# Patient Record
Sex: FEMALE
[De-identification: names, ages, dates, MRNs, and addresses within clinical notes are randomized per-mention and may not be internally consistent; named-entity substitution may affect disease eponyms.]

---

## 2020-10-30 ENCOUNTER — HOSPITAL ENCOUNTER (EMERGENCY)
Dept: HOSPITAL 56 - MW.ED | Age: 25
Discharge: HOME | End: 2020-10-30
Payer: COMMERCIAL

## 2020-10-30 DIAGNOSIS — N93.8: Primary | ICD-10-CM

## 2020-10-30 DIAGNOSIS — Z79.899: ICD-10-CM

## 2020-10-30 LAB
BUN SERPL-MCNC: 10 MG/DL (ref 7–18)
CHLORIDE SERPL-SCNC: 104 MMOL/L (ref 98–107)
CO2 SERPL-SCNC: 22.7 MMOL/L (ref 21–32)
GLUCOSE SERPL-MCNC: 81 MG/DL (ref 74–106)
POTASSIUM SERPL-SCNC: 3.6 MMOL/L (ref 3.5–5.1)
SODIUM SERPL-SCNC: 139 MMOL/L (ref 136–145)

## 2020-10-30 PROCEDURE — 99284 EMERGENCY DEPT VISIT MOD MDM: CPT

## 2020-10-30 PROCEDURE — 76830 TRANSVAGINAL US NON-OB: CPT

## 2020-10-30 PROCEDURE — 81025 URINE PREGNANCY TEST: CPT

## 2020-10-30 PROCEDURE — 36415 COLL VENOUS BLD VENIPUNCTURE: CPT

## 2020-10-30 PROCEDURE — 81001 URINALYSIS AUTO W/SCOPE: CPT

## 2020-10-30 PROCEDURE — 85025 COMPLETE CBC W/AUTO DIFF WBC: CPT

## 2020-10-30 PROCEDURE — 80053 COMPREHEN METABOLIC PANEL: CPT

## 2020-10-30 NOTE — US
HISTORY:



Vaginal bleeding; 3 months postpartum.



TECHNIQUE:



Transabdominal and transvaginal pelvic ultrasound.



COMPARISON:



No prior.



FINDINGS:



The uterus measures approximately 4.4 x 5.1 x 8.5 cm in size. There is an 

IUD within the uterus which appears appropriately positioned. Endometrial 

stripe thickness is thin measuring 4 mm. No uterine mass appreciated.



-



Right ovary measures 1.6 x 3.1 x 3 cm in size. Small follicles present 

within the right ovary. Blood flow detected within the right ovary without 

findings of torsion.



-



Left ovary measures 1.9 x 2.6 x 2.8 cm in size. Small follicles are present 

within the left ovary. Blood flow is detected within left ovary without 

findings of torsion.



-



No significant pelvic free fluid.



IMPRESSION:



1. IUD appears appropriately positioned.



2. Thin endometrial stripe of 4 mm.



3. Normal ovaries.



Dictated by Ki Martin MD @ 10/30/2020 2:32:27 PM



Dictated by: Ki Martin MD @ 10/30/2020 14:32:32



(Electronically Signed)

## 2020-10-30 NOTE — EDM.PDOC
ED HPI GENERAL MEDICAL PROBLEM





- General


Chief Complaint: OB/GYN Problem


Stated Complaint: HEMORRHAGING/DIZZINESS


Time Seen by Provider: 10/30/20 11:55


Source of Information: Reports: Patient


History Limitations: Reports: No Limitations





- History of Present Illness


INITIAL COMMENTS - FREE TEXT/NARRATIVE: 


HISTORY AND PHYSICAL:





History of present illness:


Patient is a 24-year-old female who presents to the emergency room with 

complaints of vaginal bleeding.  She is 3 months postpartum, uncomplicated 

pregnancy/delivery.  States she had an IUD placed 6 weeks postpartum without any

complications.  She has no concerns of pregnancy.  1 week ago she completed a 

"normal period".  3 days ago she had started heavy vaginal bleeding stating she 

is going through multiple pads and tampons, which is unusual for her.  She 

complains of feeling dizzy and lightheaded.  No recent pelvic injury/trauma or 

rough physical activity.  Patient denies any fever, chills, headache, change in 

vision, chest pain, back pain, shortness of breath or cough. Denies any 

abdominal pain, nausea, vomiting, diarrhea, constipation or dysuria. Has not 

noted any blood in urine or stool. Patient has been eating and drinking 

appropriately.





Review of systems: 


As per history of present illness and below otherwise all systems reviewed and 

negative.





Past medical history: 


As per history of present illness and as reviewed below otherwise 

noncontributory.





Surgical history: 


As per history of present illness and as reviewed below otherwise 

noncontributory.





Social history: 


See social history for further information





Family history: 


As per history of present illness and as reviewed below otherwise 

noncontributory.





Physical exam:


General: Well developed and well nourished. Alert and orientated x 3. Nontoxic 

in appearance and in no acute distress. Vital signs are stable and have been 

reviewed by me. Nursing notes were reviewed. 


HEENT: Atraumatic, normocephalic, pupils equal and reactive bilaterally, 

negative for conjunctival pallor or scleral icterus, mucous membranes moist, TMs

normal bilaterally, throat clear, neck supple, nontender, trachea midline. No 

drooling or trismus noted. No meningeal signs. No hot potato voice noted. 


Lungs: Clear to auscultation, breath sounds equal bilaterally, chest nontender. 

Normal work of breathing, no accessory muscles used.


Heart: S1S2, regular rate and rhythm without overt murmur


Abdomen: Soft, nondistended, nontender. Negative for masses or 

hepatosplenomegaly. Negative for costovertebral tenderness.


Pelvis: Stable nontender.


Genitourinary: This was done with consent and a chaperone at the bedside.  

Normal-appearing external genitalia.  Speculum exam shows fresh and dark brown 

blood in the vaginal vault, small amount of trickling coming from the os.  No 

clots or foreign body noted.  No cervical motion tenderness.


Skin: Intact, warm, dry. No lesions or rashes noted.


Hematologic: No petechiae or purpra. Mucosa appropriate color and normal nail 

bed color and refill.


Extremities: Atraumatic, moves all extremities per self without difficulty or 

deficits, negative for cords or calf pain. Neurovascular unremarkable.


Neuro: Awake, alert, oriented. Cranial nerves II through XII unremarkable. 

Cerebellum unremarkable. Motor and sensory unremarkable throughout. Exam 

nonfocal.


Psychiatric: Mood and affect are appropriate.  Normal thought process. Answering

questions appropriately.





Notes:


Lab work is unremarkable.  My pelvic exam shows no evidence of profuse/moderate 

vaginal bleeding.  Her vital signs remained stable and the Ortho static vital 

signs are within normal limits.  Ultrasound shows an IUD in the appropriate 

position.  Normal ovaries. I have spoken with the patient/caregiver and 

discussed today's findings, in addition to providing specific details for plan 

of care.  Reassessment at the time of disposition demonstrates that the patient 

is in no acute distress.  The patient has remained stable throughout the entire 

ED visit and is without objective evidence for acute process requiring urgent 

intervention or hospitalization.  The patient is stable for discharge, 

counseling was provided and we discussed in great detail signs and symptoms that

would prompt them to return to the Emergency Department. Medication, follow up 

and supportive care measures were reviewed and discussed. Voices understanding 

and is agreeable to plan of care. Denies any further questions or concerns at 

this time.





Diagnostics:


CBC, CMP, UA, urine pregnancy, transvaginal ultrasound





Therapeutics:


IV fluids





Prescription:


None





Impression: 


Dysfunctional Uterine Bleeding





Plan:


1. Today your lab work and pelvic ultrasound are normal. 


2.  Can alternate Tylenol and ibuprofen as needed for pain management.  Make 

sure you are drinking plenty of fluids to prevent dehydration.


3. We encourage you to follow up with your OBGYN in the next few days for re-

evaluation and further care/management. If your symptoms should worsen, new 

symptoms develop or any of the signs and symptoms we discussed should arise 

please return to the emergency room or call 911 (if needed).





Definitive disposition and diagnosis as appropriate pending reevaluation and 

review of above.





  ** back


Pain Score (Numeric/FACES): 8





- Related Data


                                    Allergies











Allergy/AdvReac Type Severity Reaction Status Date / Time


 


No Known Allergies Allergy   Verified 10/30/20 12:37











Home Meds: 


                                    Home Meds





Sertraline [Zoloft] 100 mg PO DAILY 10/30/20 [History]











ED ROS GENERAL





- Review of Systems


Review Of Systems: Comprehensive ROS is negative, except as noted in HPI.





ED EXAM, GI/ABD





- Physical Exam


Exam: See Below (See dictation)





Course





- Vital Signs


Last Recorded V/S: 


                                Last Vital Signs











Temp  97.9 F   10/30/20 11:56


 


Pulse  72   10/30/20 11:56


 


Resp  17   10/30/20 11:56


 


BP  117/76   10/30/20 11:56


 


Pulse Ox  99   10/30/20 11:56








                                        





Orthostatic Blood Pressure [     110/78


Standing]                        


Orthostatic Blood Pressure [     116/76


Sitting]                         


Orthostatic Blood Pressure [     111/72


Supine]                          











- Orders/Labs/Meds


Orders: 


                               Active Orders 24 hr











 Category Date Time Status


 


 Orthostatic Vital Signs [RC] ASDIRECTED Care  10/30/20 11:56 Active











Labs: 


                                Laboratory Tests











  10/30/20 10/30/20 10/30/20 Range/Units





  12:00 12:40 13:33 


 


WBC   8.15   (4.0-11.0)  K/uL


 


RBC   4.68   (4.30-5.90)  M/uL


 


Hgb   13.7   (12.0-16.0)  g/dL


 


Hct   41.6   (36.0-46.0)  %


 


MCV   88.9   (80.0-98.0)  fL


 


MCH   29.3   (27.0-32.0)  pg


 


MCHC   32.9   (31.0-37.0)  g/dL


 


RDW Std Deviation   52.4   (28.0-62.0)  fl


 


RDW Coeff of Grisel   16 H   (11.0-15.0)  %


 


Plt Count   321   (150-400)  K/uL


 


MPV   10.30   (7.40-12.00)  fL


 


Neut % (Auto)   58.1   (48.0-80.0)  %


 


Lymph % (Auto)   33.7   (16.0-40.0)  %


 


Mono % (Auto)   5.3   (0.0-15.0)  %


 


Eos % (Auto)   2.7   (0.0-7.0)  %


 


Baso % (Auto)   0.2   (0.0-1.5)  %


 


Neut # (Auto)   4.7   (1.4-5.7)  K/uL


 


Lymph # (Auto)   2.8 H   (0.6-2.4)  K/uL


 


Mono # (Auto)   0.4   (0.0-0.8)  K/uL


 


Eos # (Auto)   0.2   (0.0-0.7)  K/uL


 


Baso # (Auto)   0.0   (0.0-0.1)  K/uL


 


Nucleated RBC %   0.0   /100WBC


 


Nucleated RBCs #   0   K/uL


 


Sodium  139    (136-145)  mmol/L


 


Potassium  3.6    (3.5-5.1)  mmol/L


 


Chloride  104    ()  mmol/L


 


Carbon Dioxide  22.7    (21.0-32.0)  mmol/L


 


BUN  10    (7.0-18.0)  mg/dL


 


Creatinine  0.7    (0.6-1.0)  mg/dL


 


Est Cr Clr Drug Dosing  98.01    mL/min


 


Estimated GFR (MDRD)  > 60.0    ml/min


 


Glucose  81    ()  mg/dL


 


Calcium  9.2    (8.5-10.1)  mg/dL


 


Total Bilirubin  0.4    (0.2-1.0)  mg/dL


 


AST  18    (15-37)  IU/L


 


ALT  22    (14-63)  IU/L


 


Alkaline Phosphatase  123 H    ()  U/L


 


Total Protein  7.4    (6.4-8.2)  g/dL


 


Albumin  4.5    (3.4-5.0)  g/dL


 


Globulin  2.9    (2.6-4.0)  g/dL


 


Albumin/Globulin Ratio  1.6    (0.9-1.6)  


 


Urine Color    YELLOW  


 


Urine Appearance    CLEAR  


 


Urine pH    7.0  (5.0-8.0)  


 


Ur Specific Gravity    1.010  (1.001-1.035)  


 


Urine Protein    NEGATIVE  (NEGATIVE)  mg/dL


 


Urine Glucose (UA)    NEGATIVE  (NEGATIVE)  mg/dL


 


Urine Ketones    NEGATIVE  (NEGATIVE)  mg/dL


 


Urine Occult Blood    SMALL H  (NEGATIVE)  


 


Urine Nitrite    NEGATIVE  (NEGATIVE)  


 


Urine Bilirubin    NEGATIVE  (NEGATIVE)  


 


Urine Urobilinogen    0.2  (<2.0)  EU/dL


 


Ur Leukocyte Esterase    NEGATIVE  (NEGATIVE)  


 


Urine RBC    1-2  (0-2/HPF)  


 


Urine WBC    0-1  (0-5/HPF)  


 


Ur Epithelial Cells    FEW  (NONE-FEW)  


 


Urine Bacteria    RARE  (NEGATIVE)  


 


Urine HCG, Qual     (NEGATIVE)  














  10/30/20 Range/Units





  13:33 


 


WBC   (4.0-11.0)  K/uL


 


RBC   (4.30-5.90)  M/uL


 


Hgb   (12.0-16.0)  g/dL


 


Hct   (36.0-46.0)  %


 


MCV   (80.0-98.0)  fL


 


MCH   (27.0-32.0)  pg


 


MCHC   (31.0-37.0)  g/dL


 


RDW Std Deviation   (28.0-62.0)  fl


 


RDW Coeff of Grisel   (11.0-15.0)  %


 


Plt Count   (150-400)  K/uL


 


MPV   (7.40-12.00)  fL


 


Neut % (Auto)   (48.0-80.0)  %


 


Lymph % (Auto)   (16.0-40.0)  %


 


Mono % (Auto)   (0.0-15.0)  %


 


Eos % (Auto)   (0.0-7.0)  %


 


Baso % (Auto)   (0.0-1.5)  %


 


Neut # (Auto)   (1.4-5.7)  K/uL


 


Lymph # (Auto)   (0.6-2.4)  K/uL


 


Mono # (Auto)   (0.0-0.8)  K/uL


 


Eos # (Auto)   (0.0-0.7)  K/uL


 


Baso # (Auto)   (0.0-0.1)  K/uL


 


Nucleated RBC %   /100WBC


 


Nucleated RBCs #   K/uL


 


Sodium   (136-145)  mmol/L


 


Potassium   (3.5-5.1)  mmol/L


 


Chloride   ()  mmol/L


 


Carbon Dioxide   (21.0-32.0)  mmol/L


 


BUN   (7.0-18.0)  mg/dL


 


Creatinine   (0.6-1.0)  mg/dL


 


Est Cr Clr Drug Dosing   mL/min


 


Estimated GFR (MDRD)   ml/min


 


Glucose   ()  mg/dL


 


Calcium   (8.5-10.1)  mg/dL


 


Total Bilirubin   (0.2-1.0)  mg/dL


 


AST   (15-37)  IU/L


 


ALT   (14-63)  IU/L


 


Alkaline Phosphatase   ()  U/L


 


Total Protein   (6.4-8.2)  g/dL


 


Albumin   (3.4-5.0)  g/dL


 


Globulin   (2.6-4.0)  g/dL


 


Albumin/Globulin Ratio   (0.9-1.6)  


 


Urine Color   


 


Urine Appearance   


 


Urine pH   (5.0-8.0)  


 


Ur Specific Gravity   (1.001-1.035)  


 


Urine Protein   (NEGATIVE)  mg/dL


 


Urine Glucose (UA)   (NEGATIVE)  mg/dL


 


Urine Ketones   (NEGATIVE)  mg/dL


 


Urine Occult Blood   (NEGATIVE)  


 


Urine Nitrite   (NEGATIVE)  


 


Urine Bilirubin   (NEGATIVE)  


 


Urine Urobilinogen   (<2.0)  EU/dL


 


Ur Leukocyte Esterase   (NEGATIVE)  


 


Urine RBC   (0-2/HPF)  


 


Urine WBC   (0-5/HPF)  


 


Ur Epithelial Cells   (NONE-FEW)  


 


Urine Bacteria   (NEGATIVE)  


 


Urine HCG, Qual  NEGATIVE  (NEGATIVE)  











Meds: 


Medications














Discontinued Medications














Generic Name Dose Route Start Last Admin





  Trade Name Freq  PRN Reason Stop Dose Admin


 


Sodium Chloride  1,000 mls @ 999 mls/hr  10/30/20 11:56  10/30/20 12:38





  Normal Saline  IV  10/30/20 12:56  999 mls/hr





  STAT ONE   Administration














Departure





- Departure


Time of Disposition: 14:45


Disposition: Home, Self-Care 01


Clinical Impression: 


 Dysfunctional uterine bleeding








- Discharge Information


Instructions:  Dysfunctional Uterine Bleeding


Referrals: 


PCP,None [Primary Care Provider] - 


Forms:  ED Department Discharge


Additional Instructions: 


The following information is given to patients seen in the emergency department 

who are being discharged to home. This information is to outline your options 

for follow-up care. We provide all patients seen in our emergency department 

with a follow-up referral.





The need for follow-up, as well as the timing and circumstances, are variable 

depending upon the specifics of your emergency department visit.





If you don't have a primary care physician on staff, we will provide you with a 

referral. We always advise you to contact your personal physician following an 

emergency department visit to inform them of the circumstance of the visit and 

for follow-up with them and/or the need for any referrals to a consulting 

specialist.





The emergency department will also refer you to a specialist when appropriate. 

This referral assures that you have the opportunity for follow-up care with a 

specialist. All of these measure are taken in an effort to provide you with 

optimal care, which includes your follow-up.





Under all circumstances we always encourage you to contact your private 

physician who remains a resource for coordinating your care. When calling for 

follow-up care, please make the office aware that this follow-up is from your 

recent emergency room visit. If for any reason you are refused follow-up, please

contact the [facility] Emergency Department

at [phone] and asked to speak to the emergency department charge nurse.





[facility]


Primary Care


[address] [place] [id number]


Phone: [phone]


Fax: [phone]





[address] [address]


Phone: [phone]


Fax: [phone]





Thank you for choosing the [facility] emergency department in 

[place] for your medical needs today.  It was a pleasure caring for you. Today

you were seen in the emergency department for vaginal bleeding.





1. Today your lab work and pelvic ultrasound are normal. Your IUD is in place. 

No evidence of severe/significant blood loss.


2.  Can alternate Tylenol and ibuprofen as needed for pain management.  Make 

sure you are drinking plenty of fluids to prevent dehydration.


3. We encourage you to follow up with your OBGYN in the next few days for re-

evaluation and further care/management. If your symptoms should worsen, new 

symptoms develop or any of the signs and symptoms we discussed should arise 

please return to the emergency room or call 911 (if needed).





Sepsis Event Note (ED)





- Evaluation


Sepsis Screening Result: No Definite Risk





- Focused Exam


Vital Signs: 


                                   Vital Signs











  Temp Pulse Resp BP Pulse Ox


 


 10/30/20 11:56  97.9 F  72  17  117/76  99














- My Orders


Last 24 Hours: 


My Active Orders





10/30/20 11:56


Orthostatic Vital Signs [RC] ASDIRECTED 














- Assessment/Plan


Last 24 Hours: 


My Active Orders





10/30/20 11:56


Orthostatic Vital Signs [RC] ASDIRECTED

## 2020-12-03 ENCOUNTER — HOSPITAL ENCOUNTER (EMERGENCY)
Dept: HOSPITAL 56 - MW.ED | Age: 25
LOS: 1 days | Discharge: PSYCHIATRIC OR SUBSTANCE USE FACILITY | End: 2020-12-04
Payer: COMMERCIAL

## 2020-12-03 DIAGNOSIS — F32.9: ICD-10-CM

## 2020-12-03 DIAGNOSIS — Z79.899: ICD-10-CM

## 2020-12-03 DIAGNOSIS — T43.222A: Primary | ICD-10-CM

## 2020-12-03 DIAGNOSIS — Z20.828: ICD-10-CM

## 2020-12-03 LAB
APAP SERPL-MCNC: <2 UG/ML
BUN SERPL-MCNC: 9 MG/DL (ref 7–18)
CHLORIDE SERPL-SCNC: 105 MMOL/L (ref 98–107)
CO2 SERPL-SCNC: 26.5 MMOL/L (ref 21–32)
GLUCOSE SERPL-MCNC: 80 MG/DL (ref 74–106)
POTASSIUM SERPL-SCNC: 3.8 MMOL/L (ref 3.5–5.1)
SODIUM SERPL-SCNC: 143 MMOL/L (ref 136–145)

## 2020-12-03 PROCEDURE — U0002 COVID-19 LAB TEST NON-CDC: HCPCS

## 2020-12-03 NOTE — EDM.PDOC
ED HPI GENERAL MEDICAL PROBLEM





- General


Chief Complaint: Drug or Alcohol Abuse


Stated Complaint: OVERDOSE


Time Seen by Provider: 20 20:40





- History of Present Illness


INITIAL COMMENTS - FREE TEXT/NARRATIVE: 





HISTORY AND PHYSICAL:





History of present illness:


This is a 24-year-old female who presents ER today secondary to ingestion of 

1000 mg of her Zoloft approximately 6 PM tonight.  Patient reports that she does

have a history significant for depression and anxiety in the past and that she 

has had an episode in the past where she attempted overdose as a suicide 

attempt.  Patient reports that although she does not think she was try to harm 

her self she definitely feels like this was a cry for help.  Patient feels 

overwhelmed with the amount of work that she has to do at home with 3 children. 

She reports that her  works a lot and that she cares for her 3 young 

children on her own.  Patient reports that her youngest is 5-month-old.  She 

reports it is a spontaneous vaginal delivery but was complicated with nuchal 

cord.  She reports that her baby has stomach problems and cries a lot and does 

not sleep very much at home.  Patient denies any recent fevers, shakes, chills, 

nausea, vomiting, diarrhea, dysuria, frequency, urgency, chest pain, shortness 

of breath.  Patient has no coronavirus or Covid exposure concerns.  Patient 

denies any auditory or visual hallucinations.





Patient denies any history of hypertension, diabetes, liver, lung, kidney 

problems.  Patient reports that she did have elevated blood sugar during 

pregnancies.


Patient reports that she does smoke cigarettes but denies any alcohol or drugs.


Patient has no known drug allergies.


Patient denies any abdominal or chest surgeries in the past.


Patient's mental health history significant for depression and anxiety.





Review of systems: 


As per history of present illness and below otherwise all systems reviewed and 

negative.





Past medical history: 


As per history of present illness and as reviewed below otherwise 

noncontributory.





Surgical history: 


As per history of present illness and as reviewed below otherwise 

noncontributory.





Social history: 


No reported history of drug or alcohol abuse.





Family history: 


As per history of present illness and as reviewed below otherwise 

noncontributory.





Physical exam:


Constitutional: Patient is oriented to person, place, and time.  Appears well-

developed and well-nourished.  No distress.


 HEENT: Moist mucous membranes


 Head: Normocephalic and atraumatic


 Eyes: Right eye exhibits no discharge.  Left eye exhibits no discharge.  No 

scleral icterus


 Neck: Normal range of motion.  No tracheal deviation present.


 Cardiovascular: Normal rate and regular rhythm.


 Pulmonary: Effort normal, no respiratory distress.


Abd:  Soft, nondistended, no rebound/guarding, no psoas or obturator signs, no 

tenderness at Mcberney's point, no Fuller's sign.  Pt does not present with an 

exam that would be consistent with an acute surgical abdomen at this time


 Musculoskeletal: Normal range of motion


 Neurologic: Alert and oriented to person, place and time.


 Skin: Pink, warm and dry.  


 Psychiatric: Patient has a very flat affect during evaluation with episodes of 

crying during my evaluation.  Patient appears to be extremely depressed and 

tearful.  Patient is otherwise appropriate.


 Nursing note and vital signs have been reviewed





Diagnostics:


CBC, CMP, Tylenol, salicylate, alcohol level.  UDS, coronavirus test.  EKG











Assessment and plan:


This is a 24-year-old female who is  3 para 3 and approximately 6-month 

postpartum who presents ER today with an intentional overdose of 100 mg Zoloft 

tablets x10 pills.  Patient reports that she did not secondary to depression and

too much stress in her life.  Patient reports that she feels like she is alone. 

Patient reports that she is  but her  works all the time and that 

caring for 3 children on her own was too much stress.  Patient reports she does 

have a history of SI in the past and has overdosed once in the distant past.  

Patient was monitored here in the ER for approximately 8 hours postingestion and

has been clinically and hemodynamically stable throughout.  Case has been 

discussed with poison control.  Patient has been cooperative.  No medication has

been administered in the ED.





Patient be need to transferred for mental health.  I have discussed the case 

with Dr. Merchant at [facility] and he has agreed to assist us with 

inpatient level of care and monitoring of this patient's mental health.











Definitive disposition and diagnosis as appropriate pending reevaluation and 

review of above.











- Related Data


                                    Allergies











Allergy/AdvReac Type Severity Reaction Status Date / Time


 


No Known Allergies Allergy   Verified 20 20:30











Home Meds: 


                                    Home Meds





Sertraline [Zoloft] 100 mg PO DAILY 10/30/20 [History]











Past Medical History


OB/GYN History: Reports: Pregnancy


Psychiatric History: Reports: Depression


Hematologic History: Reports: Anemia





Social & Family History





- Family History


Family Medical History: No Pertinent Family History





- Tobacco Use


Tobacco Use Status *Q: Never Tobacco User





- Caffeine Use


Caffeine Use: Reports: None





- Recreational Drug Use


Recreational Drug Use: No





ED ROS GENERAL





- Review of Systems


Review Of Systems: See Below





ED EXAM, GENERAL





- Physical Exam


Exam: See Below


  ** #1 Interpretation


EKG Interpretation Comments: 





EKG:


As interpreted by ER physician: Tyler:


Nonspecific ST-T wave abnormalities


Normal axis


No evidence of ST elevation MI


Normal sinus rhythm heart rate of 85





Course





- Vital Signs


Last Recorded V/S: 


                                Last Vital Signs











Temp  99.6 F   20 20:26


 


Pulse  77   20 01:55


 


Resp  14   20 01:55


 


BP  113/72   20 01:55


 


Pulse Ox  97   20 01:55














- Orders/Labs/Meds


Orders: 


                               Active Orders 24 hr











 Category Date Time Status


 


 EKG Documentation Completion [RC] STAT Care  20 20:40 Active











Labs: 


                                Laboratory Tests











  20 Range/Units





  20:55 20:55 21:05 


 


WBC  10.16    (4.0-11.0)  K/uL


 


RBC  4.89    (4.30-5.90)  M/uL


 


Hgb  14.9    (12.0-16.0)  g/dL


 


Hct  45.0    (36.0-46.0)  %


 


MCV  92.0    (80.0-98.0)  fL


 


MCH  30.5    (27.0-32.0)  pg


 


MCHC  33.1    (31.0-37.0)  g/dL


 


RDW Std Deviation  46.0    (28.0-62.0)  fl


 


RDW Coeff of Grisel  14    (11.0-15.0)  %


 


Plt Count  330    (150-400)  K/uL


 


MPV  10.40    (7.40-12.00)  fL


 


Neut % (Auto)  52.8    (48.0-80.0)  %


 


Lymph % (Auto)  36.1    (16.0-40.0)  %


 


Mono % (Auto)  7.3    (0.0-15.0)  %


 


Eos % (Auto)  3.3    (0.0-7.0)  %


 


Baso % (Auto)  0.5    (0.0-1.5)  %


 


Neut # (Auto)  5.4    (1.4-5.7)  K/uL


 


Lymph # (Auto)  3.7 H    (0.6-2.4)  K/uL


 


Mono # (Auto)  0.7    (0.0-0.8)  K/uL


 


Eos # (Auto)  0.3    (0.0-0.7)  K/uL


 


Baso # (Auto)  0.1    (0.0-0.1)  K/uL


 


Nucleated RBC %  0.0    /100WBC


 


Nucleated RBCs #  0    K/uL


 


Sodium   143   (136-145)  mmol/L


 


Potassium   3.8   (3.5-5.1)  mmol/L


 


Chloride   105   ()  mmol/L


 


Carbon Dioxide   26.5   (21.0-32.0)  mmol/L


 


BUN   9   (7.0-18.0)  mg/dL


 


Creatinine   0.8   (0.6-1.0)  mg/dL


 


Est Cr Clr Drug Dosing   85.76   mL/min


 


Estimated GFR (MDRD)   > 60.0   ml/min


 


Glucose   80   ()  mg/dL


 


Calcium   9.1   (8.5-10.1)  mg/dL


 


Magnesium   2.1   (1.8-2.4)  mg/dL


 


Total Bilirubin   0.4   (0.2-1.0)  mg/dL


 


AST   15   (15-37)  IU/L


 


ALT   17   (14-63)  IU/L


 


Alkaline Phosphatase   109   ()  U/L


 


Total Protein   7.4   (6.4-8.2)  g/dL


 


Albumin   4.2   (3.4-5.0)  g/dL


 


Globulin   3.2   (2.6-4.0)  g/dL


 


Albumin/Globulin Ratio   1.3   (0.9-1.6)  


 


TSH 3rd Generation   0.66   (0.36-3.74)  uIU/mL


 


Urine Color    YELLOW  


 


Urine Appearance    SLT CLOUDY  


 


Urine pH    5.5  (5.0-8.0)  


 


Ur Specific Gravity    1.020  (1.001-1.035)  


 


Urine Protein    NEGATIVE  (NEGATIVE)  mg/dL


 


Urine Glucose (UA)    NEGATIVE  (NEGATIVE)  mg/dL


 


Urine Ketones    NEGATIVE  (NEGATIVE)  mg/dL


 


Urine Occult Blood    LARGE H  (NEGATIVE)  


 


Urine Nitrite    NEGATIVE  (NEGATIVE)  


 


Urine Bilirubin    NEGATIVE  (NEGATIVE)  


 


Urine Urobilinogen    0.2  (<2.0)  EU/dL


 


Ur Leukocyte Esterase    TRACE H  (NEGATIVE)  


 


Urine RBC    1-3  (0-2/HPF)  


 


Urine WBC    3-5  (0-5/HPF)  


 


Ur Epithelial Cells    MODERATE  (NONE-FEW)  


 


Amorphous Sediment    LIGHT  (NEGATIVE)  


 


Urine Bacteria    1+ H  (NEGATIVE)  


 


Urine Mucus    LIGHT  (NONE-MOD)  


 


Urine HCG, Qual     (NEGATIVE)  


 


Salicylates   3.9   (0-20)  mg/dL


 


Urine Opiates Screen     (NEGATIVE)  


 


Ur Oxycodone Screen     (NEGATIVE)  


 


Urine Methadone Screen     (NEGATIVE)  


 


Acetaminophen   <2.0   ug/mL


 


Ur Barbiturates Screen     (NEGATIVE)  


 


Ur Phencyclidine Scrn     (NEGATIVE)  


 


Ur Amphetamine Screen     (NEGATIVE)  


 


U Methamphetamines Scrn     (NEGATIVE)  


 


U Benzodiazepines Scrn     (NEGATIVE)  


 


U Cocaine Metab Screen     (NEGATIVE)  


 


U Marijuana (THC) Screen     (NEGATIVE)  


 


Ethyl Alcohol   < 3.0   mg/dL


 


SARS-CoV-2 RNA (KAILEE)     (NEGATIVE)  














  20 Range/Units





  21:10 21:14 21:14 


 


WBC     (4.0-11.0)  K/uL


 


RBC     (4.30-5.90)  M/uL


 


Hgb     (12.0-16.0)  g/dL


 


Hct     (36.0-46.0)  %


 


MCV     (80.0-98.0)  fL


 


MCH     (27.0-32.0)  pg


 


MCHC     (31.0-37.0)  g/dL


 


RDW Std Deviation     (28.0-62.0)  fl


 


RDW Coeff of Grisel     (11.0-15.0)  %


 


Plt Count     (150-400)  K/uL


 


MPV     (7.40-12.00)  fL


 


Neut % (Auto)     (48.0-80.0)  %


 


Lymph % (Auto)     (16.0-40.0)  %


 


Mono % (Auto)     (0.0-15.0)  %


 


Eos % (Auto)     (0.0-7.0)  %


 


Baso % (Auto)     (0.0-1.5)  %


 


Neut # (Auto)     (1.4-5.7)  K/uL


 


Lymph # (Auto)     (0.6-2.4)  K/uL


 


Mono # (Auto)     (0.0-0.8)  K/uL


 


Eos # (Auto)     (0.0-0.7)  K/uL


 


Baso # (Auto)     (0.0-0.1)  K/uL


 


Nucleated RBC %     /100WBC


 


Nucleated RBCs #     K/uL


 


Sodium     (136-145)  mmol/L


 


Potassium     (3.5-5.1)  mmol/L


 


Chloride     ()  mmol/L


 


Carbon Dioxide     (21.0-32.0)  mmol/L


 


BUN     (7.0-18.0)  mg/dL


 


Creatinine     (0.6-1.0)  mg/dL


 


Est Cr Clr Drug Dosing     mL/min


 


Estimated GFR (MDRD)     ml/min


 


Glucose     ()  mg/dL


 


Calcium     (8.5-10.1)  mg/dL


 


Magnesium     (1.8-2.4)  mg/dL


 


Total Bilirubin     (0.2-1.0)  mg/dL


 


AST     (15-37)  IU/L


 


ALT     (14-63)  IU/L


 


Alkaline Phosphatase     ()  U/L


 


Total Protein     (6.4-8.2)  g/dL


 


Albumin     (3.4-5.0)  g/dL


 


Globulin     (2.6-4.0)  g/dL


 


Albumin/Globulin Ratio     (0.9-1.6)  


 


TSH 3rd Generation     (0.36-3.74)  uIU/mL


 


Urine Color     


 


Urine Appearance     


 


Urine pH     (5.0-8.0)  


 


Ur Specific Gravity     (1.001-1.035)  


 


Urine Protein     (NEGATIVE)  mg/dL


 


Urine Glucose (UA)     (NEGATIVE)  mg/dL


 


Urine Ketones     (NEGATIVE)  mg/dL


 


Urine Occult Blood     (NEGATIVE)  


 


Urine Nitrite     (NEGATIVE)  


 


Urine Bilirubin     (NEGATIVE)  


 


Urine Urobilinogen     (<2.0)  EU/dL


 


Ur Leukocyte Esterase     (NEGATIVE)  


 


Urine RBC     (0-2/HPF)  


 


Urine WBC     (0-5/HPF)  


 


Ur Epithelial Cells     (NONE-FEW)  


 


Amorphous Sediment     (NEGATIVE)  


 


Urine Bacteria     (NEGATIVE)  


 


Urine Mucus     (NONE-MOD)  


 


Urine HCG, Qual   NEGATIVE   (NEGATIVE)  


 


Salicylates     (0-20)  mg/dL


 


Urine Opiates Screen    NEGATIVE  (NEGATIVE)  


 


Ur Oxycodone Screen    NEGATIVE  (NEGATIVE)  


 


Urine Methadone Screen    NEGATIVE  (NEGATIVE)  


 


Acetaminophen     ug/mL


 


Ur Barbiturates Screen    NEGATIVE  (NEGATIVE)  


 


Ur Phencyclidine Scrn    NEGATIVE  (NEGATIVE)  


 


Ur Amphetamine Screen    NEGATIVE  (NEGATIVE)  


 


U Methamphetamines Scrn    NEGATIVE  (NEGATIVE)  


 


U Benzodiazepines Scrn    NEGATIVE  (NEGATIVE)  


 


U Cocaine Metab Screen    NEGATIVE  (NEGATIVE)  


 


U Marijuana (THC) Screen    NEGATIVE  (NEGATIVE)  


 


Ethyl Alcohol     mg/dL


 


SARS-CoV-2 RNA (KAILEE)  NEGATIVE    (NEGATIVE)  














Departure





- Departure


Time of Disposition: 02:18


Disposition: DC/Tfer to Psych Hosp/[facility]


Condition: Good


Clinical Impression: 


 MDD (major depressive disorder), Drug overdose, intentional, Suicide attempt








- Discharge Information


Referrals: 


PCP,Not In Area [Primary Care Provider] - 


Forms:  ED Department Discharge





Sepsis Event Note (ED)





- Evaluation


Sepsis Screening Result: No Definite Risk





- Focused Exam


Vital Signs: 


                                   Vital Signs











  Temp Pulse Resp BP Pulse Ox


 


 20 01:55   77  14  113/72  97


 


 20 00:59   72  14  118/68  97


 


 20 22:28   82  14  112/85  98


 


 20 21:15   84  16  122/83  97


 


 20 20:26  99.6 F  69  14  120/84  95














- My Orders


Last 24 Hours: 


My Active Orders





20 20:40


EKG Documentation Completion [RC] STAT 














- Assessment/Plan


Last 24 Hours: 


My Active Orders





20 20:40


EKG Documentation Completion [RC] STAT